# Patient Record
Sex: FEMALE | URBAN - METROPOLITAN AREA
[De-identification: names, ages, dates, MRNs, and addresses within clinical notes are randomized per-mention and may not be internally consistent; named-entity substitution may affect disease eponyms.]

---

## 2024-10-31 ENCOUNTER — APPOINTMENT (RX ONLY)
Dept: URBAN - METROPOLITAN AREA CLINIC 155 | Facility: CLINIC | Age: 39
Setting detail: DERMATOLOGY
End: 2024-10-31

## 2024-10-31 DIAGNOSIS — Z41.9 ENCOUNTER FOR PROCEDURE FOR PURPOSES OTHER THAN REMEDYING HEALTH STATE, UNSPECIFIED: ICD-10-CM

## 2024-10-31 PROCEDURE — ? COSMETIC CONSULTATION: GENERAL

## 2024-10-31 PROCEDURE — ? ADDITIONAL NOTES

## 2024-10-31 NOTE — PROCEDURE: ADDITIONAL NOTES
Render Risk Assessment In Note?: no
Additional Notes: Patient scheduled for Botox Happy Hour.  Patient did have a consult with Dr. Cortes’s office regarding resurfacing lasers.
Detail Level: Zone

## 2024-10-31 NOTE — HPI: FACE (AGING FACE)
How Severe Is It?: mild
Is This A New Presentation, Or A Follow-Up?: Aging Face
Additional History: Patient uses Abdi cleanser and moisturizer in addition to CeraVe sunscreen.  Did recently start an AHA serum to help with congestion on nose.

## 2025-01-23 ENCOUNTER — APPOINTMENT (OUTPATIENT)
Dept: URBAN - METROPOLITAN AREA CLINIC 155 | Facility: CLINIC | Age: 40
Setting detail: DERMATOLOGY
End: 2025-01-23

## 2025-01-23 DIAGNOSIS — L98.8 OTHER SPECIFIED DISORDERS OF THE SKIN AND SUBCUTANEOUS TISSUE: ICD-10-CM

## 2025-01-23 PROCEDURE — ? BOTOX

## 2025-01-23 ASSESSMENT — LOCATION DETAILED DESCRIPTION DERM
LOCATION DETAILED: INFERIOR MID FOREHEAD
LOCATION DETAILED: LEFT INFERIOR TEMPLE
LOCATION DETAILED: GLABELLA
LOCATION DETAILED: RIGHT INFERIOR TEMPLE
LOCATION DETAILED: NASAL ROOT

## 2025-01-23 ASSESSMENT — LOCATION ZONE DERM
LOCATION ZONE: NOSE
LOCATION ZONE: FACE

## 2025-01-23 ASSESSMENT — LOCATION SIMPLE DESCRIPTION DERM
LOCATION SIMPLE: LEFT TEMPLE
LOCATION SIMPLE: GLABELLA
LOCATION SIMPLE: RIGHT TEMPLE
LOCATION SIMPLE: INFERIOR FOREHEAD
LOCATION SIMPLE: NOSE

## 2025-01-23 NOTE — PROCEDURE: BOTOX
Additional Area 3 Units: 0
Show Anterior Platysmal Band Units: Yes
Show Right And Left Brow Units: No
Expiration Date (Month Year): 12/26
Forehead Units: 16
Price (Use Numbers Only, No Special Characters Or $): 661
Detail Level: Detailed
Glabellar Complex Units: 12
Consent: Written consent obtained. Risks include but not limited to lid/brow ptosis, bruising, swelling, diplopia, temporary effect, incomplete chemical denervation.
Additional Area 1 Location: [facility]
Post-Care Instructions: Patient instructed to not lie down for 4 hours and limit physical activity for 24 hours.
Lot #: [id number]
Dilution (U/0.1 Cc): 4
Nasal Root Units: 2
Incrementing Botox Units: By 0.5 Units

## 2025-02-05 ENCOUNTER — APPOINTMENT (OUTPATIENT)
Dept: URBAN - METROPOLITAN AREA CLINIC 155 | Facility: CLINIC | Age: 40
Setting detail: DERMATOLOGY
End: 2025-02-05

## 2025-02-05 DIAGNOSIS — Z41.9 ENCOUNTER FOR PROCEDURE FOR PURPOSES OTHER THAN REMEDYING HEALTH STATE, UNSPECIFIED: ICD-10-CM

## 2025-02-05 PROCEDURE — ? ADDITIONAL NOTES

## 2025-02-05 NOTE — PROCEDURE: ADDITIONAL NOTES
Render Risk Assessment In Note?: no
Additional Notes: Botox has set in nicely. Patient inquired about lines under eyes. Discussed filler. Patient hesitant. Refer to Ana Dent for skin care discussion.
Detail Level: Simple

## 2025-02-07 ENCOUNTER — APPOINTMENT (OUTPATIENT)
Dept: URBAN - METROPOLITAN AREA CLINIC 155 | Facility: CLINIC | Age: 40
Setting detail: DERMATOLOGY
End: 2025-02-07

## 2025-02-07 DIAGNOSIS — Z41.9 ENCOUNTER FOR PROCEDURE FOR PURPOSES OTHER THAN REMEDYING HEALTH STATE, UNSPECIFIED: ICD-10-CM

## 2025-02-07 PROCEDURE — ? SCITON BBL HERO

## 2025-02-07 PROCEDURE — ? ADDITIONAL NOTES

## 2025-02-07 ASSESSMENT — LOCATION DETAILED DESCRIPTION DERM
LOCATION DETAILED: GLABELLA
LOCATION DETAILED: NASAL DORSUM
LOCATION DETAILED: PHILTRUM
LOCATION DETAILED: RIGHT INFERIOR TEMPLE
LOCATION DETAILED: RIGHT LOWER CUTANEOUS LIP
LOCATION DETAILED: RIGHT INFERIOR CENTRAL MALAR CHEEK
LOCATION DETAILED: INFERIOR MID FOREHEAD
LOCATION DETAILED: LEFT INFERIOR CENTRAL MALAR CHEEK
LOCATION DETAILED: LEFT INFERIOR TEMPLE

## 2025-02-07 ASSESSMENT — LOCATION SIMPLE DESCRIPTION DERM
LOCATION SIMPLE: GLABELLA
LOCATION SIMPLE: LEFT TEMPLE
LOCATION SIMPLE: NOSE
LOCATION SIMPLE: RIGHT CHEEK
LOCATION SIMPLE: RIGHT LIP
LOCATION SIMPLE: INFERIOR FOREHEAD
LOCATION SIMPLE: LEFT CHEEK
LOCATION SIMPLE: UPPER LIP
LOCATION SIMPLE: RIGHT TEMPLE

## 2025-02-07 ASSESSMENT — LOCATION ZONE DERM
LOCATION ZONE: NOSE
LOCATION ZONE: LIP
LOCATION ZONE: FACE

## 2025-02-07 NOTE — HPI: OTHER
Condition:: Telangiectasia
Please Describe Your Condition:: Patient has telangiectasia heavily around her nose and scattered throughout her cheeks.  Very few brown spots, but has uneven skintone in the form of reds.

## 2025-02-07 NOTE — PROCEDURE: ADDITIONAL NOTES
Detail Level: Zone
Render Risk Assessment In Note?: no
Additional Notes: Patient starting on Revision DEJ Eye Cream.  Patient received ZO Wrinkle+Texture Repair as a GWP with her ZO products.\\n\\nNext session in 4-6 weeks.  May treat full face, or just spot treat.

## 2025-02-07 NOTE — PROCEDURE: SCITON BBL HERO
Spot Size: Finesse Adapter Size: 15 x 45 mm (No Finesse Adapter)
Rate (Hz): shot
Filter: 560nm Filter
Fluence (J/Cm2) - Will Not Render If 0: 0
Temp (C): 25
Add Setting 5?: no
Treatment Number: 1
Temp (C): 15
Pulse Width Units: milliseconds
Fluence (J/Cm2) - Will Not Render If 0: 14
Total Pulses (Optional): 90
Filter: 640nm Filter
Consent: Written consent obtained.  Risks reviewed including but not limited to crusting, scabbing, blistering, scarring, darker or lighter pigmentary change, and incomplete clearance.
Spot Size: Finesse Adapter Size: 11 mm round
Pulse Width - Will Not Render If 0: 10
Fluence (J/Cm2) - Will Not Render If 0: 5
Add Setting 3?: yes
Total Pulses (Optional): 8
Skin Type: II
Rate (Hz): 4.0
Detail Level: Zone
Procedure Note: After applying gel and applying protective eyeware, treatment was administered using the setting parameters listed above.  See attachments for additional settings.
Pulse Width - Will Not Render If 0: 3
(4) excellent
Filter: 515nm Filter
Fluence (J/Cm2) - Will Not Render If 0: 6
Fluence (J/Cm2) - Will Not Render If 0: 17
Post-Care Instructions: I reviewed with the patient in detail post-care instructions. Patient should avoid sun exposure before and after treatment.  Patient should wear sunscreen.\\n\\nMoisturizer and sunscreen applied post procedure.
Total Pulses (Optional): 480
Temp (C): 20
Price (Use Numbers Only, No Special Characters Or $): 057
Total Pulses (Optional): 406

## 2025-03-21 ENCOUNTER — APPOINTMENT (OUTPATIENT)
Dept: URBAN - METROPOLITAN AREA CLINIC 155 | Facility: CLINIC | Age: 40
Setting detail: DERMATOLOGY
End: 2025-03-21

## 2025-03-21 DIAGNOSIS — Z41.9 ENCOUNTER FOR PROCEDURE FOR PURPOSES OTHER THAN REMEDYING HEALTH STATE, UNSPECIFIED: ICD-10-CM

## 2025-03-21 PROCEDURE — ? SCITON BBL HERO

## 2025-03-21 PROCEDURE — ? LUMENIS M22

## 2025-03-21 ASSESSMENT — LOCATION DETAILED DESCRIPTION DERM
LOCATION DETAILED: RIGHT NASAL ALA
LOCATION DETAILED: LEFT SUPERIOR CENTRAL MALAR CHEEK
LOCATION DETAILED: LEFT UPPER CUTANEOUS LIP
LOCATION DETAILED: RIGHT MEDIAL FOREHEAD
LOCATION DETAILED: LEFT INFERIOR CENTRAL MALAR CHEEK
LOCATION DETAILED: GLABELLA
LOCATION DETAILED: LEFT LOWER CUTANEOUS LIP
LOCATION DETAILED: LEFT NASAL ALA
LOCATION DETAILED: LEFT SUPERIOR LATERAL BUCCAL CHEEK
LOCATION DETAILED: RIGHT NASAL ALAR GROOVE
LOCATION DETAILED: RIGHT INFERIOR CENTRAL MALAR CHEEK
LOCATION DETAILED: RIGHT CENTRAL MALAR CHEEK
LOCATION DETAILED: RIGHT SUPERIOR LATERAL BUCCAL CHEEK
LOCATION DETAILED: PHILTRUM
LOCATION DETAILED: RIGHT INFERIOR TEMPLE
LOCATION DETAILED: NASAL SUPRATIP
LOCATION DETAILED: LEFT INFERIOR TEMPLE

## 2025-03-21 ASSESSMENT — LOCATION SIMPLE DESCRIPTION DERM
LOCATION SIMPLE: LEFT NOSE
LOCATION SIMPLE: UPPER LIP
LOCATION SIMPLE: LEFT LIP
LOCATION SIMPLE: RIGHT NOSE
LOCATION SIMPLE: LEFT TEMPLE
LOCATION SIMPLE: RIGHT CHEEK
LOCATION SIMPLE: RIGHT TEMPLE
LOCATION SIMPLE: GLABELLA
LOCATION SIMPLE: LEFT CHEEK
LOCATION SIMPLE: RIGHT FOREHEAD
LOCATION SIMPLE: NOSE

## 2025-03-21 ASSESSMENT — LOCATION ZONE DERM
LOCATION ZONE: NOSE
LOCATION ZONE: LIP
LOCATION ZONE: FACE

## 2025-03-21 NOTE — PROCEDURE: LUMENIS M22
Post-Care Instructions: I reviewed with the patient in detail post-care instructions.  Patient should stay away from the sun and wear sun protection until treated areas are fully healed.
Detail Level: Detailed
Procedure Text: The patient's skin was cleaned and the procedure was performed using the aforementioned parameters.\\n\\n*settings were outside of [facility]’s parameters
Number Of Passes: 1
Fluence Units: J/cm2
External Cooling Fan Speed: 0
Skin Type (Optional): II
External Cooling: SmartCool
Treated Area: small area
Total Pulses: 81
Consent: Written consent obtained, risks reviewed including but not limited to crusting, scabbing, blistering, scarring, darker or lighter pigmentary change, bruising, and/or incomplete response.

## 2025-03-21 NOTE — PROCEDURE: SCITON BBL HERO
Spot Size: Finesse Adapter Size: 15 x 15 mm square
Pulse Width Units: milliseconds
Rate (Hz): 4.0
Fluence (J/Cm2) - Will Not Render If 0: 6.5
Rate (Hz): Shot
Detail Level: Zone
External Cooling Fan Speed: 0
Rate (Hz): 1.0
Filter: 640nm Filter
Pulse Width - Will Not Render If 0: 3
Total Pulses (Optional): 466
Fluence (J/Cm2) - Will Not Render If 0: 5
Fluence (J/Cm2) - Will Not Render If 0: 14
Add Setting 2?: yes
Temp (C): 25
Fluence (J/Cm2) - Will Not Render If 0: 15
Total Pulses (Optional): 407
Total Pulses (Optional): 20
Consent: Written consent obtained.  Risks reviewed including but not limited to crusting, scabbing, blistering, scarring, darker or lighter pigmentary change, and incomplete clearance.
Total Pulses (Optional): 33
Price (Use Numbers Only, No Special Characters Or $): 748
Pulse Width - Will Not Render If 0: 10
Add Setting 5?: no
Spot Size: Finesse Adapter Size: 15 x 45 mm (No Finesse Adapter)
Procedure Note: After applying gel and applying protective eyeware, treatment was administered using the setting parameters listed above.  See attachments for additional settings.
Filter: 560nm Filter
Filter: 515nm Filter
Treatment Number: 2
Post-Care Instructions: I reviewed with the patient in detail post-care instructions. Patient should avoid sun exposure before and after treatment.  Patient should wear sunscreen.\\n\\nMoisturizer and sunscreen applied post procedure.
Skin Type: II
Spot Size: Finesse Adapter Size: 11 mm round

## 2025-03-21 NOTE — HPI: OTHER
Condition:: Telangiectasia
Please Describe Your Condition:: Patient had one BBL treatment on 2/7/25.  Telangiectasia has improved.  Patient still has veins remaining at the base of her nostrils and a few individual veins scattered throughout cheeks.  Very few brown spots remaining and what are left are very light.\\n\\nFitzpatrick 2.

## 2025-04-17 ENCOUNTER — APPOINTMENT (OUTPATIENT)
Dept: URBAN - METROPOLITAN AREA CLINIC 155 | Facility: CLINIC | Age: 40
Setting detail: DERMATOLOGY
End: 2025-04-17

## 2025-04-17 DIAGNOSIS — L98.8 OTHER SPECIFIED DISORDERS OF THE SKIN AND SUBCUTANEOUS TISSUE: ICD-10-CM

## 2025-04-17 PROCEDURE — ? BOTOX

## 2025-04-17 NOTE — PROCEDURE: BOTOX
Show Periorbital Units: Yes
Glabellar Complex Units: 0
Price (Use Numbers Only, No Special Characters Or $): 285
Consent: Written consent obtained. Risks include but not limited to lid/brow ptosis, bruising, swelling, diplopia, temporary effect, incomplete chemical denervation.
Periorbital Skin Units: 12
Show Ucl Units: No
Post-Care Instructions: Patient instructed to not lie down for 4 hours and limit physical activity for 24 hours.
Additional Area 1 Location: [facility]
Dilution (U/0.1 Cc): 4
Incrementing Botox Units: By 0.5 Units
Lot #: [id number]
Forehead Units: 16
Detail Level: Detailed
Expiration Date (Month Year): 12(26